# Patient Record
Sex: FEMALE | Race: WHITE | NOT HISPANIC OR LATINO
[De-identification: names, ages, dates, MRNs, and addresses within clinical notes are randomized per-mention and may not be internally consistent; named-entity substitution may affect disease eponyms.]

---

## 2017-01-18 ENCOUNTER — APPOINTMENT (OUTPATIENT)
Dept: HEMATOLOGY ONCOLOGY | Facility: CLINIC | Age: 42
End: 2017-01-18

## 2017-01-31 ENCOUNTER — APPOINTMENT (OUTPATIENT)
Dept: GASTROENTEROLOGY | Facility: CLINIC | Age: 42
End: 2017-01-31

## 2017-02-06 ENCOUNTER — MESSAGE (OUTPATIENT)
Age: 42
End: 2017-02-06

## 2017-06-06 ENCOUNTER — APPOINTMENT (OUTPATIENT)
Dept: GASTROENTEROLOGY | Facility: CLINIC | Age: 42
End: 2017-06-06

## 2017-06-15 ENCOUNTER — INPATIENT (INPATIENT)
Facility: HOSPITAL | Age: 42
LOS: 2 days | Discharge: HOME | DRG: 440 | End: 2017-06-18
Attending: SPECIALIST | Admitting: SPECIALIST
Payer: COMMERCIAL

## 2017-06-15 VITALS
SYSTOLIC BLOOD PRESSURE: 102 MMHG | TEMPERATURE: 98 F | HEIGHT: 65 IN | HEART RATE: 76 BPM | RESPIRATION RATE: 18 BRPM | DIASTOLIC BLOOD PRESSURE: 63 MMHG | OXYGEN SATURATION: 100 % | WEIGHT: 128.31 LBS

## 2017-06-15 DIAGNOSIS — K85.90 ACUTE PANCREATITIS WITHOUT NECROSIS OR INFECTION, UNSPECIFIED: ICD-10-CM

## 2017-06-15 DIAGNOSIS — D50.9 IRON DEFICIENCY ANEMIA, UNSPECIFIED: ICD-10-CM

## 2017-06-15 DIAGNOSIS — K59.00 CONSTIPATION, UNSPECIFIED: ICD-10-CM

## 2017-06-15 DIAGNOSIS — R63.8 OTHER SYMPTOMS AND SIGNS CONCERNING FOOD AND FLUID INTAKE: ICD-10-CM

## 2017-06-15 DIAGNOSIS — Z29.9 ENCOUNTER FOR PROPHYLACTIC MEASURES, UNSPECIFIED: ICD-10-CM

## 2017-06-15 DIAGNOSIS — Z98.890 OTHER SPECIFIED POSTPROCEDURAL STATES: Chronic | ICD-10-CM

## 2017-06-15 DIAGNOSIS — E89.0 POSTPROCEDURAL HYPOTHYROIDISM: Chronic | ICD-10-CM

## 2017-06-15 DIAGNOSIS — E05.00 THYROTOXICOSIS WITH DIFFUSE GOITER WITHOUT THYROTOXIC CRISIS OR STORM: ICD-10-CM

## 2017-06-15 LAB
ALBUMIN SERPL ELPH-MCNC: 3.8 G/DL — SIGNIFICANT CHANGE UP (ref 3.3–5)
ALP SERPL-CCNC: 45 U/L — SIGNIFICANT CHANGE UP (ref 40–120)
ALT FLD-CCNC: 21 U/L — SIGNIFICANT CHANGE UP (ref 10–45)
ANION GAP SERPL CALC-SCNC: 15 MMOL/L — SIGNIFICANT CHANGE UP (ref 5–17)
APPEARANCE UR: CLEAR — SIGNIFICANT CHANGE UP
APTT BLD: 30.8 SEC — SIGNIFICANT CHANGE UP (ref 27.5–37.4)
AST SERPL-CCNC: 24 U/L — SIGNIFICANT CHANGE UP (ref 10–40)
BASOPHILS NFR BLD AUTO: 0 % — SIGNIFICANT CHANGE UP (ref 0–2)
BILIRUB DIRECT SERPL-MCNC: <0.2 MG/DL — SIGNIFICANT CHANGE UP (ref 0–0.2)
BILIRUB SERPL-MCNC: 1.7 MG/DL — HIGH (ref 0.2–1.2)
BILIRUB UR-MCNC: NEGATIVE — SIGNIFICANT CHANGE UP
BLD GP AB SCN SERPL QL: NEGATIVE — SIGNIFICANT CHANGE UP
BUN SERPL-MCNC: 11 MG/DL — SIGNIFICANT CHANGE UP (ref 7–23)
CALCIUM SERPL-MCNC: 8.5 MG/DL — SIGNIFICANT CHANGE UP (ref 8.4–10.5)
CHLORIDE SERPL-SCNC: 100 MMOL/L — SIGNIFICANT CHANGE UP (ref 96–108)
CO2 SERPL-SCNC: 22 MMOL/L — SIGNIFICANT CHANGE UP (ref 22–31)
COLOR SPEC: YELLOW — SIGNIFICANT CHANGE UP
CREAT SERPL-MCNC: 0.7 MG/DL — SIGNIFICANT CHANGE UP (ref 0.5–1.3)
DIFF PNL FLD: (no result)
EOSINOPHIL NFR BLD AUTO: 0.1 % — SIGNIFICANT CHANGE UP (ref 0–6)
GLUCOSE SERPL-MCNC: 96 MG/DL — SIGNIFICANT CHANGE UP (ref 70–99)
GLUCOSE UR QL: NEGATIVE — SIGNIFICANT CHANGE UP
HCG SERPL-ACNC: <.1 MIU/ML — SIGNIFICANT CHANGE UP
HCT VFR BLD CALC: 32.4 % — LOW (ref 34.5–45)
HGB BLD-MCNC: 11 G/DL — LOW (ref 11.5–15.5)
INR BLD: 1.43 — HIGH (ref 0.88–1.16)
KETONES UR-MCNC: 15 MG/DL
LEUKOCYTE ESTERASE UR-ACNC: NEGATIVE — SIGNIFICANT CHANGE UP
LYMPHOCYTES # BLD AUTO: 5.8 % — LOW (ref 13–44)
MCHC RBC-ENTMCNC: 26.8 PG — LOW (ref 27–34)
MCHC RBC-ENTMCNC: 34 G/DL — SIGNIFICANT CHANGE UP (ref 32–36)
MCV RBC AUTO: 79 FL — LOW (ref 80–100)
MONOCYTES NFR BLD AUTO: 6.2 % — SIGNIFICANT CHANGE UP (ref 2–14)
NEUTROPHILS NFR BLD AUTO: 87.9 % — HIGH (ref 43–77)
NITRITE UR-MCNC: NEGATIVE — SIGNIFICANT CHANGE UP
PH UR: 7.5 — SIGNIFICANT CHANGE UP (ref 5–8)
PLATELET # BLD AUTO: 108 K/UL — LOW (ref 150–400)
POTASSIUM SERPL-MCNC: 3.6 MMOL/L — SIGNIFICANT CHANGE UP (ref 3.5–5.3)
POTASSIUM SERPL-SCNC: 3.6 MMOL/L — SIGNIFICANT CHANGE UP (ref 3.5–5.3)
PROT SERPL-MCNC: 6.8 G/DL — SIGNIFICANT CHANGE UP (ref 6–8.3)
PROT UR-MCNC: (no result) MG/DL
PROTHROM AB SERPL-ACNC: 16 SEC — HIGH (ref 9.8–12.7)
RBC # BLD: 4.1 M/UL — SIGNIFICANT CHANGE UP (ref 3.8–5.2)
RBC # FLD: 19.6 % — HIGH (ref 10.3–16.9)
RH IG SCN BLD-IMP: POSITIVE — SIGNIFICANT CHANGE UP
SODIUM SERPL-SCNC: 137 MMOL/L — SIGNIFICANT CHANGE UP (ref 135–145)
SP GR SPEC: <=1.005 — SIGNIFICANT CHANGE UP (ref 1–1.03)
TRIGL SERPL-MCNC: 64 MG/DL — SIGNIFICANT CHANGE UP (ref 10–149)
UROBILINOGEN FLD QL: 0.2 E.U./DL — SIGNIFICANT CHANGE UP
WBC # BLD: 11.7 K/UL — HIGH (ref 3.8–10.5)
WBC # FLD AUTO: 11.7 K/UL — HIGH (ref 3.8–10.5)

## 2017-06-15 PROCEDURE — 74177 CT ABD & PELVIS W/CONTRAST: CPT | Mod: 26

## 2017-06-15 PROCEDURE — 99285 EMERGENCY DEPT VISIT HI MDM: CPT

## 2017-06-15 RX ORDER — HEPARIN SODIUM 5000 [USP'U]/ML
5000 INJECTION INTRAVENOUS; SUBCUTANEOUS EVERY 8 HOURS
Qty: 0 | Refills: 0 | Status: DISCONTINUED | OUTPATIENT
Start: 2017-06-15 | End: 2017-06-17

## 2017-06-15 RX ORDER — SODIUM CHLORIDE 9 MG/ML
1000 INJECTION INTRAMUSCULAR; INTRAVENOUS; SUBCUTANEOUS ONCE
Qty: 0 | Refills: 0 | Status: COMPLETED | OUTPATIENT
Start: 2017-06-15 | End: 2017-06-15

## 2017-06-15 RX ORDER — POLYETHYLENE GLYCOL 3350 17 G/17G
17 POWDER, FOR SOLUTION ORAL
Qty: 0 | Refills: 0 | Status: DISCONTINUED | OUTPATIENT
Start: 2017-06-15 | End: 2017-06-18

## 2017-06-15 RX ORDER — KETOROLAC TROMETHAMINE 30 MG/ML
30 SYRINGE (ML) INJECTION ONCE
Qty: 0 | Refills: 0 | Status: DISCONTINUED | OUTPATIENT
Start: 2017-06-15 | End: 2017-06-15

## 2017-06-15 RX ORDER — SODIUM CHLORIDE 9 MG/ML
3 INJECTION INTRAMUSCULAR; INTRAVENOUS; SUBCUTANEOUS ONCE
Qty: 0 | Refills: 0 | Status: COMPLETED | OUTPATIENT
Start: 2017-06-15 | End: 2017-06-15

## 2017-06-15 RX ORDER — ACETAMINOPHEN 500 MG
650 TABLET ORAL EVERY 6 HOURS
Qty: 0 | Refills: 0 | Status: DISCONTINUED | OUTPATIENT
Start: 2017-06-15 | End: 2017-06-18

## 2017-06-15 RX ORDER — MORPHINE SULFATE 50 MG/1
4 CAPSULE, EXTENDED RELEASE ORAL ONCE
Qty: 0 | Refills: 0 | Status: DISCONTINUED | OUTPATIENT
Start: 2017-06-15 | End: 2017-06-15

## 2017-06-15 RX ORDER — IOHEXOL 300 MG/ML
50 INJECTION, SOLUTION INTRAVENOUS ONCE
Qty: 0 | Refills: 0 | Status: COMPLETED | OUTPATIENT
Start: 2017-06-15 | End: 2017-06-15

## 2017-06-15 RX ORDER — LACTULOSE 10 G/15ML
20 SOLUTION ORAL DAILY
Qty: 0 | Refills: 0 | Status: DISCONTINUED | OUTPATIENT
Start: 2017-06-15 | End: 2017-06-17

## 2017-06-15 RX ORDER — FAMOTIDINE 10 MG/ML
20 INJECTION INTRAVENOUS ONCE
Qty: 0 | Refills: 0 | Status: COMPLETED | OUTPATIENT
Start: 2017-06-15 | End: 2017-06-15

## 2017-06-15 RX ORDER — SODIUM CHLORIDE 9 MG/ML
1000 INJECTION, SOLUTION INTRAVENOUS
Qty: 0 | Refills: 0 | Status: DISCONTINUED | OUTPATIENT
Start: 2017-06-15 | End: 2017-06-17

## 2017-06-15 RX ADMIN — IOHEXOL 50 MILLILITER(S): 300 INJECTION, SOLUTION INTRAVENOUS at 12:42

## 2017-06-15 RX ADMIN — SODIUM CHLORIDE 3 MILLILITER(S): 9 INJECTION INTRAMUSCULAR; INTRAVENOUS; SUBCUTANEOUS at 12:39

## 2017-06-15 RX ADMIN — SODIUM CHLORIDE 250 MILLILITER(S): 9 INJECTION, SOLUTION INTRAVENOUS at 20:52

## 2017-06-15 RX ADMIN — LACTULOSE 20 GRAM(S): 10 SOLUTION ORAL at 20:35

## 2017-06-15 RX ADMIN — Medication 30 MILLIGRAM(S): at 20:28

## 2017-06-15 RX ADMIN — Medication 30 MILLIGRAM(S): at 12:38

## 2017-06-15 RX ADMIN — Medication 30 MILLIGRAM(S): at 23:55

## 2017-06-15 RX ADMIN — MORPHINE SULFATE 4 MILLIGRAM(S): 50 CAPSULE, EXTENDED RELEASE ORAL at 18:02

## 2017-06-15 RX ADMIN — MORPHINE SULFATE 4 MILLIGRAM(S): 50 CAPSULE, EXTENDED RELEASE ORAL at 20:35

## 2017-06-15 RX ADMIN — Medication 30 MILLIGRAM(S): at 23:40

## 2017-06-15 RX ADMIN — POLYETHYLENE GLYCOL 3350 17 GRAM(S): 17 POWDER, FOR SOLUTION ORAL at 23:31

## 2017-06-15 RX ADMIN — FAMOTIDINE 20 MILLIGRAM(S): 10 INJECTION INTRAVENOUS at 12:38

## 2017-06-15 RX ADMIN — HEPARIN SODIUM 5000 UNIT(S): 5000 INJECTION INTRAVENOUS; SUBCUTANEOUS at 23:31

## 2017-06-15 RX ADMIN — SODIUM CHLORIDE 1000 MILLILITER(S): 9 INJECTION INTRAMUSCULAR; INTRAVENOUS; SUBCUTANEOUS at 12:38

## 2017-06-15 NOTE — H&P ADULT - HISTORY OF PRESENT ILLNESS
43 yo F w/ PMHx of Graves dz s/p thyroidectomy (removed 3/4 of thyroid), iron def anemia presents with acute mid-epigastric abd pain. Pt states that on sunday, she started having stomach spasms and had a large BM. On wed, pt ate spicy guacamole and noticed a stabbing pain in the mid-epigastric region that was constant, non-radiating, spasmic associated with nausea and chills. The pain was not alleviated by advil, so she decided to come to the ED. She denies fever, vomiting, diarrhea, SOB, CP, dysuria, headache. She is able to tolerate clear liquid diet. Pt is not a heavy drinker, but she did attend many parties over the last 2 weeks where she was drinking multiple glasses of wine/champagne. She normally does not drink everyday and has never had an issue with ETOH abuse. She denies having RUQ pain in the past after eating heavy meals. She recently traveled to [facility] in May-June. No abd surgeries.     In ED, T 98.4F, HR 76, /63, RR 18, 100% on RA. Given 1L NS bolus, ketorolac 30mg IVP, morphine 4mg IVP, famotidine 20mg IVP with resolution of her symptoms. CT a/p showed evidence of pancreatitis, no radio opaque gallstones. Labs notable for elevated lipase of > 977.

## 2017-06-15 NOTE — ED PROVIDER NOTE - CONSTITUTIONAL, MLM
normal... Well appearing, well nourished, awake, alert, oriented to person, place, time/situation and in moderate painful distress.

## 2017-06-15 NOTE — H&P ADULT - PROBLEM SELECTOR PLAN 2
Pt has been taking iron tabs TID and has had constipation. Last BM was yesterday morning, but was small and hard.   - will not continue ferrous sulfate inpatient  - aggressive bowel regimen with miralax and lactulose

## 2017-06-15 NOTE — ED ADULT NURSE NOTE - OBJECTIVE STATEMENT
Pt came to ED complaining of bilateral upper quadrant abd pain starting today. Pt reports severe pain and tenderness upon palpation.  Pt denies Chest pain, SOB, /GI symptoms, D/N/V, diarrhea, melena.  Positive PMS x4 extremities. Alert and oriented x3. Pt abd is soft, tender upon palpation.

## 2017-06-15 NOTE — H&P ADULT - ASSESSMENT
43 yo F w/ PMHx of Graves dz s/p thyroidectomy (removed 3/4 of thyroid), iron def anemia presents with acute mid-epigastric abd pain, admitted for pancreatitis most likely 2/2 acute alcoholic use.

## 2017-06-15 NOTE — H&P ADULT - NSHPPHYSICALEXAM_GEN_ALL_CORE
.  VITAL SIGNS:  T(C): 37.1, Max: 37.1 (06-15 @ 19:38)  T(F): 98.7, Max: 98.7 (06-15 @ 19:38)  HR: 79 (76 - 79)  BP: 100/65 (100/65 - 102/63)  BP(mean): --  RR: 18 (18 - 18)  SpO2: 100% (100% - 100%)  Wt(kg): --    PHYSICAL EXAM:    Constitutional: WDWN resting comfortably in bed; NAD  Head: NC/AT  Eyes: PERRL, EOMI, anicteric sclera  ENT: no nasal discharge; uvula midline, no oropharyngeal erythema or exudates; MMM  Neck: supple; no JVD or thyromegaly  Respiratory: CTA B/L; no W/R/R, no retractions  Cardiac: +S1/S2; RRR; no M/R/G; PMI non-displaced  Gastrointestinal: +BS, soft, TTP at mid-epigastric region. No TTP of RUQ. No suprapubic tenderness.   Extremities: WWP, no clubbing or cyanosis; no peripheral edema  Musculoskeletal: NROM x4; no joint swelling, tenderness or erythema  Lymphatic: no submandibular or cervical LAD  Neurologic: AAOx3; CNII-XII grossly intact; no focal deficits

## 2017-06-15 NOTE — ED PROVIDER NOTE - OBJECTIVE STATEMENT
Patient is a 42 year old female with PMH anemia who presents to ED c/o mid abdominal pain x 3 days. The pain is described as cramping. Pt reports that she is also constipated, however she tends to have that intermittently due to iron supplements for her anemia. Pt is scheduled for colonoscopy due to anemia on 7/7, however due to the pain this week her PMD Dr. Ricardo sent her here for evaluation. Denies fever, chills, vomiting, diarrhea, urinary symptoms, back pain, cough, chest pain, sick contacts, or recent travel. No prior abdominal surgeries. Denies illicit drug use or etoh abuse.

## 2017-06-15 NOTE — H&P ADULT - PROBLEM SELECTOR PLAN 4
Pt with h/o graves disease s/p thyroidectomy (removed 3/4 of thyroid)   - pt states she follows with her endocrinologist regularly and her TSH has been normal  - she is not taking any thyroid medication  - f/u TSH in AM

## 2017-06-15 NOTE — ED PROVIDER NOTE - ATTENDING CONTRIBUTION TO CARE
43 y/o f with abd pain mostly epigastric, ct and labs consistent with pancreatitis, no other complications such as abscess or psuedocyst, PA discussed with pmd - admission for mild pancreatitis, IVFs, NPO

## 2017-06-15 NOTE — H&P ADULT - NSHPLABSRESULTS_GEN_ALL_CORE
.  LABS:                         11.0   11.7  )-----------( 108      ( 15 Chaz 2017 12:34 )             32.4     06-15    137  |  100  |  11  ----------------------------<  96  3.6   |  22  |  0.70    Ca    8.5      15 Chaz 2017 12:34    TPro  6.8  /  Alb  3.8  /  TBili  1.7<H>  /  DBili  <0.2  /  AST  24  /  ALT  21  /  AlkPhos  45  06-15    PT/INR - ( 15 Chaz 2017 12:34 )   PT: 16.0 sec;   INR: 1.43          PTT - ( 15 Chaz 2017 12:34 )  PTT:30.8 sec  Urinalysis Basic - ( 15 Chaz 2017 20:03 )    Color: Yellow / Appearance: Clear / SG: <=1.005 / pH: x  Gluc: x / Ketone: 15 mg/dL  / Bili: NEGATIVE / Urobili: 0.2 E.U./dL   Blood: x / Protein: Trace mg/dL / Nitrite: NEGATIVE   Leuk Esterase: NEGATIVE / RBC: < 5 /HPF / WBC < 5 /HPF   Sq Epi: x / Non Sq Epi: Moderate /HPF / Bacteria: Present /HPF        RADIOLOGY, EKG & ADDITIONAL TESTS: Reviewed.    CT ABDOMEN AND PELVIS OC IC  6/15/2017    IMPRESSION:  1. Thickened distal pancreatic body and pancreatic tail with surrounding   inflammatory changes and nondrainable fluid consistent with pancreatitis.   Confirmation with amylase and lipase levels is recommended.  2. Underdistention versus rectal wall thickening which could represent   proctitis. Abundant stool throughout the colon compatible with the   patient's history of constipation.  3. Intrahepatic periportal edema  4. 1.4 cm heterogeneous lesion in the left iliac wing as noted above.

## 2017-06-15 NOTE — ED ADULT NURSE NOTE - CHPI ED SYMPTOMS NEG
no hematuria/no nausea/no diarrhea/no blood in stool/no abdominal distension/no chills/no vomiting/no fever/no burning urination/no dysuria

## 2017-06-15 NOTE — H&P ADULT - PROBLEM SELECTOR PLAN 1
Pt presenting with acute mid-epigastric abdominal tenderness, non-radiating with elevated lipase to > 977 and CT a/p findings consistent with pancreatitis. Pt meets 3/3 criteria for pancreatitis.   Etiology most likely from acute alcohol use as pt went to several parties over the last 2 weeks and drank multiple glasses of wine/champagne. Pt does not have h/o ETOH abuse.   Less likely gallstone pancreatitis as pt with BMI 21 and no RUQ abd pain after eating large fatty meals and LFTs normal.   - f/u RUQ u/s to evaluate for cholelithiasis   - f/u triglycerides   - aggressive hydration with LR @ 250cc/hr  - tylenol and ketorolac for pain relief PRN   - will try to avoid using morphine or other opiates as pt with constipation and abundant stool in the colon as seen on CT a/p.  - will start pt on clear liquid diet and advance as tolerates

## 2017-06-16 ENCOUNTER — TRANSCRIPTION ENCOUNTER (OUTPATIENT)
Age: 42
End: 2017-06-16

## 2017-06-16 LAB
ALBUMIN SERPL ELPH-MCNC: 3.2 G/DL — LOW (ref 3.3–5)
ALP SERPL-CCNC: 45 U/L — SIGNIFICANT CHANGE UP (ref 40–120)
ALT FLD-CCNC: 31 U/L — SIGNIFICANT CHANGE UP (ref 10–45)
ANION GAP SERPL CALC-SCNC: 11 MMOL/L — SIGNIFICANT CHANGE UP (ref 5–17)
AST SERPL-CCNC: 23 U/L — SIGNIFICANT CHANGE UP (ref 10–40)
BILIRUB DIRECT SERPL-MCNC: 0.2 MG/DL — SIGNIFICANT CHANGE UP (ref 0–0.2)
BILIRUB INDIRECT FLD-MCNC: 1.2 MG/DL — HIGH (ref 0.2–1)
BILIRUB SERPL-MCNC: 1.4 MG/DL — HIGH (ref 0.2–1.2)
BLD GP AB SCN SERPL QL: NEGATIVE — SIGNIFICANT CHANGE UP
BUN SERPL-MCNC: 9 MG/DL — SIGNIFICANT CHANGE UP (ref 7–23)
CALCIUM SERPL-MCNC: 8.3 MG/DL — LOW (ref 8.4–10.5)
CHLORIDE SERPL-SCNC: 100 MMOL/L — SIGNIFICANT CHANGE UP (ref 96–108)
CO2 SERPL-SCNC: 24 MMOL/L — SIGNIFICANT CHANGE UP (ref 22–31)
CREAT SERPL-MCNC: 0.7 MG/DL — SIGNIFICANT CHANGE UP (ref 0.5–1.3)
ERYTHROCYTE [SEDIMENTATION RATE] IN BLOOD: 52 MM/HR — HIGH
GLUCOSE SERPL-MCNC: 84 MG/DL — SIGNIFICANT CHANGE UP (ref 70–99)
HCT VFR BLD CALC: 29.5 % — LOW (ref 34.5–45)
HGB BLD-MCNC: 9.4 G/DL — LOW (ref 11.5–15.5)
LIDOCAIN IGE QN: 1423 U/L — HIGH (ref 7–60)
MAGNESIUM SERPL-MCNC: 2 MG/DL — SIGNIFICANT CHANGE UP (ref 1.6–2.6)
MCHC RBC-ENTMCNC: 26 PG — LOW (ref 27–34)
MCHC RBC-ENTMCNC: 31.9 G/DL — LOW (ref 32–36)
MCV RBC AUTO: 81.7 FL — SIGNIFICANT CHANGE UP (ref 80–100)
PLATELET # BLD AUTO: 90 K/UL — LOW (ref 150–400)
POTASSIUM SERPL-MCNC: 3.7 MMOL/L — SIGNIFICANT CHANGE UP (ref 3.5–5.3)
POTASSIUM SERPL-SCNC: 3.7 MMOL/L — SIGNIFICANT CHANGE UP (ref 3.5–5.3)
PROT SERPL-MCNC: 5.8 G/DL — LOW (ref 6–8.3)
RBC # BLD: 3.61 M/UL — LOW (ref 3.8–5.2)
RBC # FLD: 19.5 % — HIGH (ref 10.3–16.9)
RH IG SCN BLD-IMP: POSITIVE — SIGNIFICANT CHANGE UP
SODIUM SERPL-SCNC: 135 MMOL/L — SIGNIFICANT CHANGE UP (ref 135–145)
TSH SERPL-MCNC: 4.24 UIU/ML — SIGNIFICANT CHANGE UP (ref 0.35–4.94)
WBC # BLD: 6.1 K/UL — SIGNIFICANT CHANGE UP (ref 3.8–10.5)
WBC # FLD AUTO: 6.1 K/UL — SIGNIFICANT CHANGE UP (ref 3.8–10.5)

## 2017-06-16 PROCEDURE — 76700 US EXAM ABDOM COMPLETE: CPT | Mod: 26

## 2017-06-16 RX ORDER — KETOROLAC TROMETHAMINE 30 MG/ML
30 SYRINGE (ML) INJECTION ONCE
Qty: 0 | Refills: 0 | Status: DISCONTINUED | OUTPATIENT
Start: 2017-06-16 | End: 2017-06-16

## 2017-06-16 RX ADMIN — Medication 30 MILLIGRAM(S): at 06:04

## 2017-06-16 RX ADMIN — POLYETHYLENE GLYCOL 3350 17 GRAM(S): 17 POWDER, FOR SOLUTION ORAL at 05:56

## 2017-06-16 RX ADMIN — HEPARIN SODIUM 5000 UNIT(S): 5000 INJECTION INTRAVENOUS; SUBCUTANEOUS at 05:57

## 2017-06-16 RX ADMIN — Medication 30 MILLIGRAM(S): at 06:19

## 2017-06-16 RX ADMIN — HEPARIN SODIUM 5000 UNIT(S): 5000 INJECTION INTRAVENOUS; SUBCUTANEOUS at 15:38

## 2017-06-16 RX ADMIN — LACTULOSE 20 GRAM(S): 10 SOLUTION ORAL at 15:38

## 2017-06-16 RX ADMIN — SODIUM CHLORIDE 250 MILLILITER(S): 9 INJECTION, SOLUTION INTRAVENOUS at 21:16

## 2017-06-16 RX ADMIN — SODIUM CHLORIDE 250 MILLILITER(S): 9 INJECTION, SOLUTION INTRAVENOUS at 10:00

## 2017-06-16 RX ADMIN — Medication 650 MILLIGRAM(S): at 21:16

## 2017-06-16 RX ADMIN — SODIUM CHLORIDE 250 MILLILITER(S): 9 INJECTION, SOLUTION INTRAVENOUS at 06:21

## 2017-06-16 RX ADMIN — HEPARIN SODIUM 5000 UNIT(S): 5000 INJECTION INTRAVENOUS; SUBCUTANEOUS at 21:17

## 2017-06-16 NOTE — DISCHARGE NOTE ADULT - PLAN OF CARE
no recurrence you were admitted with abdominal pain and found to have pancreatitis. You improved with Intravenous fluids and were able to tolerate a diet. You had an ultrasound showing no gall stones. Likely etiology may be due to your increase in alcohol consumption from your baseline. Please continue to moderate your intake. Follow up with your primary care doctor and Dr. Trejo when discharged. While you were admitted it was also noted that you are anemic. Iron studies were performed indicating it is of chronic disease. Please follow up with Dr. Jnasen, a hematologist who will perform studies to monitor this and determine an etiology. While you were admitted it was also noted that your platelets are low of unknown reason. Please follow up with Dr. Jansen, a hematologist who will perform studies to monitor this and determine an etiology. you were admitted with abdominal pain and found to have pancreatitis. You improved with Intravenous fluids and were able to tolerate a diet. You had an ultrasound showing no gall stones. Likely etiology may be due to your increase in alcohol consumption from your baseline or due to an autoimmune origin which is possible since you have a history of Grave's disease which is also autoimmune. Please follow up with Dr. Krys Mitchell for rheumatology. Please continue to moderate your intake. Follow up with your primary care doctor, Dr. Renato Sy and Dr. Trejo when discharged.

## 2017-06-16 NOTE — DISCHARGE NOTE ADULT - CARE PLAN
Principal Discharge DX:	Pancreatitis  Goal:	no recurrence  Instructions for follow-up, activity and diet:	you were admitted with abdominal pain and found to have pancreatitis. You improved with Intravenous fluids and were able to tolerate a diet. You had an ultrasound showing no gall stones. Likely etiology may be due to your increase in alcohol consumption from your baseline. Please continue to moderate your intake. Follow up with your primary care doctor and Dr. Trejo when discharged. Principal Discharge DX:	Pancreatitis  Goal:	no recurrence  Instructions for follow-up, activity and diet:	you were admitted with abdominal pain and found to have pancreatitis. You improved with Intravenous fluids and were able to tolerate a diet. You had an ultrasound showing no gall stones. Likely etiology may be due to your increase in alcohol consumption from your baseline or due to an autoimmune origin which is possible since you have a history of Grave's disease which is also autoimmune. Please follow up with Dr. Krys Mitchell for rheumatology. Please continue to moderate your intake. Follow up with your primary care doctor, Dr. Renato Sy and Dr. Trejo when discharged.  Secondary Diagnosis:	Anemia, chronic disease  Instructions for follow-up, activity and diet:	While you were admitted it was also noted that you are anemic. Iron studies were performed indicating it is of chronic disease. Please follow up with Dr. Jansen, a hematologist who will perform studies to monitor this and determine an etiology.  Secondary Diagnosis:	Thrombocytopenia  Instructions for follow-up, activity and diet:	While you were admitted it was also noted that your platelets are low of unknown reason. Please follow up with Dr. Jansen, a hematologist who will perform studies to monitor this and determine an etiology.

## 2017-06-16 NOTE — PROGRESS NOTE ADULT - PROBLEM SELECTOR PLAN 1
Pt presenting with acute mid-epigastric abdominal tenderness, non-radiating with elevated lipase to > 977 and CT a/p findings consistent with pancreatitis. Pt meets 3/3 criteria for pancreatitis. No signiicant etoh consumption; though endorsed multiple parties over   - f/u RUQ u/s to evaluate for cholelithiasis   - f/u triglycerides   - aggressive hydration with LR @ 250cc/hr  - tylenol and ketorolac for pain relief PRN   - will try to avoid using morphine or other opiates as pt with constipation and abundant stool in the colon as seen on CT a/p.  - will start pt on clear liquid diet and advance as tolerates

## 2017-06-16 NOTE — DISCHARGE NOTE ADULT - OTHER SIGNIFICANT FINDINGS
Abdominal ultrasound: FINDINGS: Ultrasound evaluation of the abdomen demonstrates no focal   hepatic abnormalities. The liver is normal in size.  No dilated   intrahepatic bile ducts are seen.   The common bile duct is normal in   diameter measuring 0.2 cm.  The gallbladder is normal in appearance   without visible gallstones or wall thickening.   There is small amount of   pericholecystic fluid. The visualized portions of the pancreas are   hypoechoic and enlarged consistent with pancreatitis. Pancreatic duct is   not dilated.   The spleen is 11.0 cm in length and normal in appearance.   The visualized portions of the abdominal aorta and inferior vena cava are   normal in appearance.       The right kidney is 11.9 cm in length and normal in appearance. The left   kidney is 11.1 cm in length and normal in appearance.       IMPRESSION:  Findings consistent with pancreatitis.  Spleen within normal limits of size.  No cholelithiasis or biliary dilatation.    CT abdomen and Pelvis:   IMPRESSION:  1. Thickened distal pancreatic body and pancreatic tail with surrounding   inflammatory changes and nondrainable fluid consistent with pancreatitis.   Confirmation with amylase and lipase levels is recommended.  2. Underdistention versus rectal wall thickening which could represent   proctitis. Abundant stool throughout the colon compatible with the   patient's history of constipation.  3. Intrahepatic periportal edema  4. 1.4 cm heterogeneous lesion in the left iliac wing as noted above.

## 2017-06-16 NOTE — DISCHARGE NOTE ADULT - CARE PROVIDERS DIRECT ADDRESSES
[email or web address]

## 2017-06-16 NOTE — DISCHARGE NOTE ADULT - HOSPITAL COURSE
2 yo F w/ PMHx of Graves dz s/p thyroidectomy (removed 3/4 of thyroid), iron def anemia presents with acute mid-epigastric abd pain found to have pancreatitis (lipase, ct scan, and abdominal pain); started on IVF, LR, and clear liquid diet. Had ultrasound showing no cholelithiasis, evidence of pancreatitis. Patient with increase of etoh from baseline etoh consumption as possible etiology, but given hx autoimmune obtained IgG, ESR, and BRAD for possible autoimmune pancreatitis. Patient improved clinically and is stable for discharge home. 43 yo F w/ PMHx of Graves dz s/p thyroidectomy (removed 3/4 of thyroid), iron def anemia presents with acute mid-epigastric abd pain found to have pancreatitis (lipase, ct scan, and abdominal pain); started on IVF, LR, and clear liquid diet. Had ultrasound showing no cholelithiasis, evidence of pancreatitis. Patient with increase of etoh from baseline with etoh consumption as possible etiology, but given hx autoimmune (Graves disease) obtained IgG, ESR, and BRAD for possible autoimmune pancreatitis. ESR was elevated at 54. She was also noted to be anemic with iron studies consistent with AOCD as well as thrombocytopenia for unknown etiology. Abd U/s was performed and negative for splenomegaly, no gallstones. Patient improved clinically and is stable for discharge home with follow up with Dr. Trejo, her PCP Dr. Renato Sy, Dr. Jansen (hematologist) for anemia/thrombocytopenia, and Dr. Jason for rheumatology.

## 2017-06-16 NOTE — PROGRESS NOTE ADULT - SUBJECTIVE AND OBJECTIVE BOX
:  41 yo F w/ PMHx of Graves dz s/p thyroidectomy (removed 3/4 of thyroid), iron def anemia, vitiligo  presents with acute mid-epigastric abd pain. Pt states that on Sunday, she started having stomach spasms and had a large BM. On wed, pt ate spicy guacamole and noticed a stabbing pain in the mid-epigastric region that was constant, non-radiating, spasmic associated with nausea and chills. The pain was not alleviated by advil, so she decided to come to the ED. She denies fever, vomiting, diarrhea, SOB, CP, dysuria, headache. She is able to tolerate clear liquid diet. Pt is not a heavy drinker, but she did attend many parties over the last 2 weeks where she was drinking multiple glasses of wine/champagne. She normally does not drink everyday and has never had an issue with ETOH abuse. She denies having RUQ pain in the past after eating heavy meals. She recently traveled to [facility] in May-June. No abd surgeries.           REVIEW OF SYSTEMS:  Constitutional: No fever, weight loss or fatigue  ENMT:  No difficulty hearing, tinnitus, vertigo; No sinus or throat pain  Respiratory: No cough, wheezing, chills or hemoptysis  Cardiovascular: No chest pain, palpitations, dizziness or leg swelling  Gastrointestinal: less abdominal/epigastric pain. No nausea, vomiting or hematemesis; No diarrhea or constipation. No melena or hematochezia.  Skin: No itching, burning, rashes or lesions   Musculoskeletal: No joint pain or swelling; No muscle, back or extremity pain    PAST MEDICAL & SURGICAL HISTORY:  Graves disease  Iron deficiency anemia  No pertinent past medical history  H/O partial thyroidectomy: 3/4ths removed  S/P hernia repair      FAMILY HISTORY:  No pertinent family history in first degree relatives      SOCIAL HISTORY:  Smoking Status: [ ] Current, [ ] Former, [ ] Never  Pack Years:    MEDICATIONS:  MEDICATIONS  (STANDING):  lactated ringers. 1000milliLiter(s) IV Continuous <Continuous>  heparin  Injectable 5000Unit(s) SubCutaneous every 8 hours  lactulose Syrup 20Gram(s) Oral daily  polyethylene glycol 3350 17Gram(s) Oral two times a day    MEDICATIONS  (PRN):  acetaminophen   Tablet 650milliGRAM(s) Oral every 6 hours PRN moderate pain      Allergies    No Known Allergies    Intolerances        Vital Signs Last 24 Hrs  T(C): 36.8, Max: 37.4 (06-15 @ 23:06)  T(F): 98.2, Max: 99.4 (06-15 @ 23:06)  HR: 80 (62 - 83)  BP: 119/63 (97/61 - 119/63)  BP(mean): --  RR: 14 (14 - 18)  SpO2: 100% (97% - 100%)        PHYSICAL EXAM:    General: Well developed; well nourished; in no acute distress  HEENT: MMM, conjunctiva and sclera clear  Gastrointestinal: Soft, non-tender non-distended; Normal bowel sounds; No rebound or guarding  Extremities: Normal range of motion, No clubbing, cyanosis or edema  Neurological: Alert and oriented x3  Skin: Warm and dry. No obvious rash      LABS:                        9.4    6.1   )-----------( 90       ( 16 Jun 2017 06:21 )             29.5     06-16    135  |  100  |  9   ----------------------------<  84  3.7   |  24  |  0.70    Ca    8.3<L>      16 Jun 2017 06:22  Mg     2.0     06-16    TPro  5.8<L>  /  Alb  3.2<L>  /  TBili  1.4<H>  /  DBili  0.2  /  AST  23  /  ALT  31  /  AlkPhos  45  06-16          RADIOLOGY & ADDITIONAL STUDIES:   CT ABDOMEN AND PELVIS 1. Thickened distal pancreatic body and pancreatic tail with surrounding   inflammatory changes and nondrainable fluid consistent with pancreatitis.   Confirmation with amylase and lipase levels is recommended.  2. Underdistention versus rectal wall thickening which could represent   proctitis. Abundant stool throughout the colon compatible with the   patient's history of constipation.  3. Intrahepatic periportal edema  4. 1.4 cm heterogeneous lesion in the left iliac wing as noted above.

## 2017-06-16 NOTE — PROGRESS NOTE ADULT - SUBJECTIVE AND OBJECTIVE BOX
INTERVAL HPI/OVERNIGHT EVENTS:  Patient was seen and examined at bedside. As per nurse and patient, no o/n events, patient resting comfortably. Occasional abdominal pain, no nausea or vomiting at this time. Seen with tea at bedside, advised to consume only as tolerated and not excessive. Discussed plan for ultrasound.     VITAL SIGNS:  T(F): 98.2  HR: 80  BP: 119/63  RR: 14  SpO2: 100%  Wt(kg): --    PHYSICAL EXAM:    Constitutional: WDWN, NAD  Eyes: PERRL, EOMI, sclera non-icteric  Neck: supple, trachea midline, no masses, no JVD  Respiratory: CTA b/l, good air entry b/l, no wheezing, rhonchi, rales, without accessory muscle use and no intercostal retractions  Cardiovascular: RRR, normal S1S2, no M/R/G  Gastrointestinal: soft, ND, no masses palpable, BS normal; +mid epigastric  tenderness on deep palpation.   Extremities: Warm, well perfused, pulses equal bilateral upper and lower extremities, no edema, no clubbing  Neurological: AAOx3, CN Grossly intact  Skin: Normal temperature, warm, dry    MEDICATIONS  (STANDING):  lactated ringers. 1000milliLiter(s) IV Continuous <Continuous>  heparin  Injectable 5000Unit(s) SubCutaneous every 8 hours  lactulose Syrup 20Gram(s) Oral daily  polyethylene glycol 3350 17Gram(s) Oral two times a day    MEDICATIONS  (PRN):  acetaminophen   Tablet 650milliGRAM(s) Oral every 6 hours PRN moderate pain      Allergies    No Known Allergies    Intolerances        LABS:                        9.4    6.1   )-----------( 90       ( 16 Jun 2017 06:21 )             29.5     06-16    135  |  100  |  9   ----------------------------<  84  3.7   |  24  |  0.70    Ca    8.3<L>      16 Jun 2017 06:22  Mg     2.0     06-16    TPro  5.8<L>  /  Alb  3.2<L>  /  TBili  1.4<H>  /  DBili  0.2  /  AST  23  /  ALT  31  /  AlkPhos  45  06-16    PT/INR - ( 15 Chaz 2017 12:34 )   PT: 16.0 sec;   INR: 1.43          PTT - ( 15 Chaz 2017 12:34 )  PTT:30.8 sec  Urinalysis Basic - ( 15 Chaz 2017 20:03 )    Color: Yellow / Appearance: Clear / SG: <=1.005 / pH: x  Gluc: x / Ketone: 15 mg/dL  / Bili: NEGATIVE / Urobili: 0.2 E.U./dL   Blood: x / Protein: Trace mg/dL / Nitrite: NEGATIVE   Leuk Esterase: NEGATIVE / RBC: < 5 /HPF / WBC < 5 /HPF   Sq Epi: x / Non Sq Epi: Moderate /HPF / Bacteria: Present /HPF        RADIOLOGY & ADDITIONAL TESTS:

## 2017-06-16 NOTE — DISCHARGE NOTE ADULT - PATIENT PORTAL LINK FT
“You can access the Follow[facility] Patient Portal, offered by [facility], by registering with the following website: [email or web address]”

## 2017-06-16 NOTE — DISCHARGE NOTE ADULT - ADDITIONAL INSTRUCTIONS
Please follow up with your PCP Dr. Renato Sy, Dr. Trejo for future colonoscopy, Dr. Jansen (hematologist) and Dr. Krys Mitchell (rheumatology and autoimmune) within 1-2 weeks. Please continue to advance your diet as tolerated and avoid fatty foods initially.

## 2017-06-16 NOTE — DISCHARGE NOTE ADULT - MEDICATION SUMMARY - MEDICATIONS TO TAKE
I will START or STAY ON the medications listed below when I get home from the hospital:    ferrous sulfate 250 mg oral tablet  -- 1 tab(s) by mouth 3 times a day  -- Indication: For Anemia, chronic disease

## 2017-06-16 NOTE — DISCHARGE NOTE ADULT - CARE PROVIDER_API CALL
Kaiden Trejo), Medicine  [address] [facility] [address]  [place] [address]  Phone: [phone]  Fax: [phone] Kaiden Trejo), Medicine  [address]  [address]  [place] [address]  Phone: [phone]  Fax: [phone]    Krys Mitchell), Internal Medicine; Rheumatology  [address]  [place] [address]  Phone: [phone]  Fax: [phone]    Diandra Jansen), Hematology; Internal Medicine  [address] [address]  Phone: [phone]  Fax: [phone]

## 2017-06-17 DIAGNOSIS — D63.8 ANEMIA IN OTHER CHRONIC DISEASES CLASSIFIED ELSEWHERE: ICD-10-CM

## 2017-06-17 DIAGNOSIS — D69.6 THROMBOCYTOPENIA, UNSPECIFIED: ICD-10-CM

## 2017-06-17 LAB
ALBUMIN SERPL ELPH-MCNC: 3.6 G/DL — SIGNIFICANT CHANGE UP (ref 3.3–5)
ALP SERPL-CCNC: 159 U/L — HIGH (ref 40–120)
ALT FLD-CCNC: 51 U/L — HIGH (ref 10–45)
ANION GAP SERPL CALC-SCNC: 12 MMOL/L — SIGNIFICANT CHANGE UP (ref 5–17)
AST SERPL-CCNC: 38 U/L — SIGNIFICANT CHANGE UP (ref 10–40)
BILIRUB DIRECT SERPL-MCNC: <0.2 MG/DL — SIGNIFICANT CHANGE UP (ref 0–0.2)
BILIRUB INDIRECT FLD-MCNC: >0.7 MG/DL — SIGNIFICANT CHANGE UP (ref 0.2–1)
BILIRUB SERPL-MCNC: 0.9 MG/DL — SIGNIFICANT CHANGE UP (ref 0.2–1.2)
BUN SERPL-MCNC: 6 MG/DL — LOW (ref 7–23)
CALCIUM SERPL-MCNC: 8.2 MG/DL — LOW (ref 8.4–10.5)
CHLORIDE SERPL-SCNC: 105 MMOL/L — SIGNIFICANT CHANGE UP (ref 96–108)
CO2 SERPL-SCNC: 22 MMOL/L — SIGNIFICANT CHANGE UP (ref 22–31)
CREAT SERPL-MCNC: 0.6 MG/DL — SIGNIFICANT CHANGE UP (ref 0.5–1.3)
FERRITIN SERPL-MCNC: 162.5 NG/ML — HIGH (ref 15–150)
GLUCOSE SERPL-MCNC: 82 MG/DL — SIGNIFICANT CHANGE UP (ref 70–99)
HCT VFR BLD CALC: 27.3 % — LOW (ref 34.5–45)
HGB BLD-MCNC: 8.9 G/DL — LOW (ref 11.5–15.5)
IRON SATN MFR SERPL: 10 % — LOW (ref 14–50)
IRON SATN MFR SERPL: 25 UG/DL — LOW (ref 30–160)
MCHC RBC-ENTMCNC: 26 PG — LOW (ref 27–34)
MCHC RBC-ENTMCNC: 32.6 G/DL — SIGNIFICANT CHANGE UP (ref 32–36)
MCV RBC AUTO: 79.8 FL — LOW (ref 80–100)
PLATELET # BLD AUTO: 78 K/UL — LOW (ref 150–400)
POTASSIUM SERPL-MCNC: 4.1 MMOL/L — SIGNIFICANT CHANGE UP (ref 3.5–5.3)
POTASSIUM SERPL-SCNC: 4.1 MMOL/L — SIGNIFICANT CHANGE UP (ref 3.5–5.3)
PROT SERPL-MCNC: 6.5 G/DL — SIGNIFICANT CHANGE UP (ref 6–8.3)
RBC # BLD: 3.42 M/UL — LOW (ref 3.8–5.2)
RBC # FLD: 19.3 % — HIGH (ref 10.3–16.9)
SODIUM SERPL-SCNC: 139 MMOL/L — SIGNIFICANT CHANGE UP (ref 135–145)
TIBC SERPL-MCNC: 242 UG/DL — SIGNIFICANT CHANGE UP (ref 220–430)
UIBC SERPL-MCNC: 217 UG/DL — SIGNIFICANT CHANGE UP (ref 110–370)
WBC # BLD: 6 K/UL — SIGNIFICANT CHANGE UP (ref 3.8–10.5)
WBC # FLD AUTO: 6 K/UL — SIGNIFICANT CHANGE UP (ref 3.8–10.5)

## 2017-06-17 RX ORDER — SODIUM CHLORIDE 9 MG/ML
1000 INJECTION, SOLUTION INTRAVENOUS
Qty: 0 | Refills: 0 | Status: DISCONTINUED | OUTPATIENT
Start: 2017-06-17 | End: 2017-06-18

## 2017-06-17 RX ORDER — SODIUM FERRIC GLUCONAT/SUCROSE 62.5MG/5ML
25 AMPUL (ML) INTRAVENOUS ONCE
Qty: 0 | Refills: 0 | Status: COMPLETED | OUTPATIENT
Start: 2017-06-17 | End: 2017-06-17

## 2017-06-17 RX ORDER — SODIUM FERRIC GLUCONAT/SUCROSE 62.5MG/5ML
100 AMPUL (ML) INTRAVENOUS ONCE
Qty: 0 | Refills: 0 | Status: COMPLETED | OUTPATIENT
Start: 2017-06-17 | End: 2017-06-17

## 2017-06-17 RX ORDER — KETOROLAC TROMETHAMINE 30 MG/ML
15 SYRINGE (ML) INJECTION ONCE
Qty: 0 | Refills: 0 | Status: DISCONTINUED | OUTPATIENT
Start: 2017-06-17 | End: 2017-06-17

## 2017-06-17 RX ADMIN — POLYETHYLENE GLYCOL 3350 17 GRAM(S): 17 POWDER, FOR SOLUTION ORAL at 17:24

## 2017-06-17 RX ADMIN — Medication 15 MILLIGRAM(S): at 04:15

## 2017-06-17 RX ADMIN — Medication 650 MILLIGRAM(S): at 13:34

## 2017-06-17 RX ADMIN — SODIUM CHLORIDE 250 MILLILITER(S): 9 INJECTION, SOLUTION INTRAVENOUS at 04:18

## 2017-06-17 RX ADMIN — HEPARIN SODIUM 5000 UNIT(S): 5000 INJECTION INTRAVENOUS; SUBCUTANEOUS at 05:39

## 2017-06-17 RX ADMIN — SODIUM CHLORIDE 125 MILLILITER(S): 9 INJECTION, SOLUTION INTRAVENOUS at 23:13

## 2017-06-17 RX ADMIN — Medication 312 MILLIGRAM(S): at 17:24

## 2017-06-17 RX ADMIN — Medication 216 MILLIGRAM(S): at 22:01

## 2017-06-17 NOTE — PROGRESS NOTE ADULT - PROBLEM SELECTOR PLAN 2
Patient with elevated ESR, Ferritin of 162 and TIBC low range of normal with history of autoimmune diseases. In this setting the Transferrin saturation fo 10 should be itnereperted as ACD.  No role for Colonoscopy. Patient with elevated ESR, Ferritin of 162 and TIBC low range of normal with history of autoimmune diseases. In this setting the Transferrin saturation of 10 should be interrupted as ACD.  No role for Colonoscopy.

## 2017-06-17 NOTE — PROGRESS NOTE ADULT - SUBJECTIVE AND OBJECTIVE BOX
OVERNIGHT: No overnight events.  SUBJECTIVE: Patient seen and examined at bedside. Tolerating diet.     ROS:  CV: Denies chest pain  Resp: Denies SOB  GI: Denies abdominal pain, constipation, diarrhea, nausea, vomiting  : Denies dysuria  ID: Denies fevers, chills  MSK: Denies joint pain     OBJECTIVE:    VITAL SIGNS:  ICU Vital Signs Last 24 Hrs  T(C): 36.6, Max: 37.1 (06-17 @ 05:43)  T(F): 97.9, Max: 98.7 (06-17 @ 05:43)  HR: 65 (65 - 72)  BP: 119/81 (105/67 - 119/81)  BP(mean): --  ABP: --  ABP(mean): --  RR: 20 (18 - 20)  SpO2: 96% (96% - 99%)        I & Os for current day (as of 06-17 @ 17:05)  =============================================  IN: 5750 ml / OUT: 0 ml / NET: 5750 ml    CAPILLARY BLOOD GLUCOSE      PHYSICAL EXAM:    General: NAD, comfortable  HEENT: NCAT, PERRL,  conjunctival pallor, no scleral icterus, vitilligo  Neck: supple, no JVD  Respiratory: CTA b/l, no wheezing, rhonchi, rales  Cardiovascular: RRR, normal S1S2, no M/R/G  Abdomen: soft, NT/ND, bowel sounds in all four quadrants, no palpable masses  Extremities: WWP, no clubbing, cyanosis, or edema      MEDICATIONS:  MEDICATIONS  (STANDING):  polyethylene glycol 3350 17Gram(s) Oral two times a day  lactated ringers. 1000milliLiter(s) IV Continuous <Continuous>  sodium ferric gluconate complex IVPB 25milliGRAM(s) IV Intermittent once    MEDICATIONS  (PRN):  acetaminophen   Tablet 650milliGRAM(s) Oral every 6 hours PRN moderate pain      ALLERGIES:  Allergies    No Known Allergies    Intolerances        LABS:                        8.9    6.0   )-----------( 78       ( 17 Jun 2017 06:53 )             27.3     06-17    139  |  105  |  6<L>  ----------------------------<  82  4.1   |  22  |  0.60    Ca    8.2<L>      17 Jun 2017 06:53  Mg     2.0     06-16    TPro  6.5  /  Alb  3.6  /  TBili  0.9  /  DBili  <0.2  /  AST  38  /  ALT  51<H>  /  AlkPhos  159<H>  06-17      Urinalysis Basic - ( 15 Chaz 2017 20:03 )    Color: Yellow / Appearance: Clear / SG: <=1.005 / pH: x  Gluc: x / Ketone: 15 mg/dL  / Bili: NEGATIVE / Urobili: 0.2 E.U./dL   Blood: x / Protein: Trace mg/dL / Nitrite: NEGATIVE   Leuk Esterase: NEGATIVE / RBC: < 5 /HPF / WBC < 5 /HPF   Sq Epi: x / Non Sq Epi: Moderate /HPF / Bacteria: Present /HPF        RADIOLOGY & ADDITIONAL TESTS: Reviewed.

## 2017-06-17 NOTE — PROGRESS NOTE ADULT - SUBJECTIVE AND OBJECTIVE BOX
Pt seen and examined  Still with episodes of epigastric pain    REVIEW OF SYSTEMS:  Constitutional: No fever, weight loss or fatigue  Cardiovascular: No chest pain, palpitations, dizziness or leg swelling  Gastrointestinal: + abdominal  pain. No nausea, vomiting or hematemesis; No diarrhea or constipation. No melena or hematochezia.  Skin: No itching, burning, rashes or lesions       MEDICATIONS:  MEDICATIONS  (STANDING):  lactated ringers. 1000milliLiter(s) IV Continuous <Continuous>  polyethylene glycol 3350 17Gram(s) Oral two times a day    MEDICATIONS  (PRN):  acetaminophen   Tablet 650milliGRAM(s) Oral every 6 hours PRN moderate pain      Allergies    No Known Allergies    Intolerances        Vital Signs Last 24 Hrs  T(C): 37.1, Max: 37.1 (06-17 @ 05:43)  T(F): 98.7, Max: 98.7 (06-17 @ 05:43)  HR: 72 (72 - 76)  BP: 105/67 (105/67 - 110/70)  BP(mean): --  RR: 18 (17 - 18)  SpO2: 97% (97% - 99%)    I & Os for current day (as of 06-17 @ 11:56)  =============================================  IN: 5750 ml / OUT: 0 ml / NET: 5750 ml      PHYSICAL EXAM:    General: Well developed; well nourished; in no acute distress  HEENT: MMM, conjunctiva and sclera clear  Gastrointestinal: Soft non-tender non-distended; Normal bowel sounds; No hepatosplenomegaly  Skin: Warm and dry. No obvious rash    LABS:      CBC Full  -  ( 17 Jun 2017 06:53 )  WBC Count : 6.0 K/uL  Hemoglobin : 8.9 g/dL  Hematocrit : 27.3 %  Platelet Count - Automated : 78 K/uL  Mean Cell Volume : 79.8 fL  Mean Cell Hemoglobin : 26.0 pg  Mean Cell Hemoglobin Concentration : 32.6 g/dL  Auto Neutrophil # : x  Auto Lymphocyte # : x  Auto Monocyte # : x  Auto Eosinophil # : x  Auto Basophil # : x  Auto Neutrophil % : x  Auto Lymphocyte % : x  Auto Monocyte % : x  Auto Eosinophil % : x  Auto Basophil % : x    06-17    139  |  105  |  6<L>  ----------------------------<  82  4.1   |  22  |  0.60    Ca    8.2<L>      17 Jun 2017 06:53  Mg     2.0     06-16    TPro  5.8<L>  /  Alb  3.2<L>  /  TBili  1.4<H>  /  DBili  0.2  /  AST  23  /  ALT  31  /  AlkPhos  45  06-16    PT/INR - ( 15 Chaz 2017 12:34 )   PT: 16.0 sec;   INR: 1.43          PTT - ( 15 Chaz 2017 12:34 )  PTT:30.8 sec      Urinalysis Basic - ( 15 Chaz 2017 20:03 )    Color: Yellow / Appearance: Clear / SG: <=1.005 / pH: x  Gluc: x / Ketone: 15 mg/dL  / Bili: NEGATIVE / Urobili: 0.2 E.U./dL   Blood: x / Protein: Trace mg/dL / Nitrite: NEGATIVE   Leuk Esterase: NEGATIVE / RBC: < 5 /HPF / WBC < 5 /HPF   Sq Epi: x / Non Sq Epi: Moderate /HPF / Bacteria: Present /HPF                RADIOLOGY & ADDITIONAL STUDIES (The following images were personally reviewed):

## 2017-06-17 NOTE — PROGRESS NOTE ADULT - PROBLEM SELECTOR PLAN 1
Pt presenting with acute mid-epigastric abdominal tenderness, non-radiating with elevated lipase to > 977 and CT a/p findings consistent with pancreatitis. Pt meets 3/3 criteria for pancreatitis. No signiicant etoh consumption; though endorsed multiple parties over   - f/u RUQ u/s to evaluate for cholelithiasis   - f/u triglycerides   - Now on    - tylenol and ketorolac for pain relief PRN   - will try to avoid using morphine or other opiates as pt with constipation and abundant stool in the colon as seen on CT a/p.  -Advancing diet Pt presenting with acute mid-epigastric abdominal tenderness, non-radiating with elevated lipase to > 977 and CT a/p findings consistent with pancreatitis. Pt meets 3/3 criteria for pancreatitis. No significant etoh consumption; though endorsed multiple parties over   - f/u RUQ u/s to evaluate for cholelithiasis   - f/u triglycerides   - Now on    - tylenol and ketorolac for pain relief PRN   - will try to avoid using morphine or other opiates as pt with constipation and abundant stool in the colon as seen on CT a/p.  -Advancing diet

## 2017-06-18 VITALS
HEART RATE: 64 BPM | SYSTOLIC BLOOD PRESSURE: 126 MMHG | DIASTOLIC BLOOD PRESSURE: 75 MMHG | OXYGEN SATURATION: 97 % | TEMPERATURE: 98 F | RESPIRATION RATE: 19 BRPM

## 2017-06-18 LAB
ANA TITR SER: NEGATIVE — SIGNIFICANT CHANGE UP
ANION GAP SERPL CALC-SCNC: 13 MMOL/L — SIGNIFICANT CHANGE UP (ref 5–17)
BUN SERPL-MCNC: 7 MG/DL — SIGNIFICANT CHANGE UP (ref 7–23)
CALCIUM SERPL-MCNC: 8 MG/DL — LOW (ref 8.4–10.5)
CHLORIDE SERPL-SCNC: 105 MMOL/L — SIGNIFICANT CHANGE UP (ref 96–108)
CO2 SERPL-SCNC: 21 MMOL/L — LOW (ref 22–31)
CREAT SERPL-MCNC: 0.6 MG/DL — SIGNIFICANT CHANGE UP (ref 0.5–1.3)
GLUCOSE SERPL-MCNC: 88 MG/DL — SIGNIFICANT CHANGE UP (ref 70–99)
HCT VFR BLD CALC: 25.9 % — LOW (ref 34.5–45)
HGB BLD-MCNC: 8.7 G/DL — LOW (ref 11.5–15.5)
MAGNESIUM SERPL-MCNC: 1.7 MG/DL — SIGNIFICANT CHANGE UP (ref 1.6–2.6)
MCHC RBC-ENTMCNC: 26.6 PG — LOW (ref 27–34)
MCHC RBC-ENTMCNC: 33.6 G/DL — SIGNIFICANT CHANGE UP (ref 32–36)
MCV RBC AUTO: 79.2 FL — LOW (ref 80–100)
PLATELET # BLD AUTO: 88 K/UL — LOW (ref 150–400)
POTASSIUM SERPL-MCNC: 3.6 MMOL/L — SIGNIFICANT CHANGE UP (ref 3.5–5.3)
POTASSIUM SERPL-SCNC: 3.6 MMOL/L — SIGNIFICANT CHANGE UP (ref 3.5–5.3)
RBC # BLD: 3.27 M/UL — LOW (ref 3.8–5.2)
RBC # FLD: 19.1 % — HIGH (ref 10.3–16.9)
SODIUM SERPL-SCNC: 139 MMOL/L — SIGNIFICANT CHANGE UP (ref 135–145)
WBC # BLD: 4.4 K/UL — SIGNIFICANT CHANGE UP (ref 3.8–10.5)
WBC # FLD AUTO: 4.4 K/UL — SIGNIFICANT CHANGE UP (ref 3.8–10.5)

## 2017-06-18 PROCEDURE — 80048 BASIC METABOLIC PNL TOTAL CA: CPT

## 2017-06-18 PROCEDURE — 82787 IGG 1 2 3 OR 4 EACH: CPT

## 2017-06-18 PROCEDURE — 80053 COMPREHEN METABOLIC PANEL: CPT

## 2017-06-18 PROCEDURE — 36415 COLL VENOUS BLD VENIPUNCTURE: CPT

## 2017-06-18 PROCEDURE — 85027 COMPLETE CBC AUTOMATED: CPT

## 2017-06-18 PROCEDURE — 85730 THROMBOPLASTIN TIME PARTIAL: CPT

## 2017-06-18 PROCEDURE — 86901 BLOOD TYPING SEROLOGIC RH(D): CPT

## 2017-06-18 PROCEDURE — 83550 IRON BINDING TEST: CPT

## 2017-06-18 PROCEDURE — 99285 EMERGENCY DEPT VISIT HI MDM: CPT | Mod: 25

## 2017-06-18 PROCEDURE — 86900 BLOOD TYPING SEROLOGIC ABO: CPT

## 2017-06-18 PROCEDURE — 81001 URINALYSIS AUTO W/SCOPE: CPT

## 2017-06-18 PROCEDURE — 74177 CT ABD & PELVIS W/CONTRAST: CPT

## 2017-06-18 PROCEDURE — 85610 PROTHROMBIN TIME: CPT

## 2017-06-18 PROCEDURE — 82248 BILIRUBIN DIRECT: CPT

## 2017-06-18 PROCEDURE — 84443 ASSAY THYROID STIM HORMONE: CPT

## 2017-06-18 PROCEDURE — 83735 ASSAY OF MAGNESIUM: CPT

## 2017-06-18 PROCEDURE — 82728 ASSAY OF FERRITIN: CPT

## 2017-06-18 PROCEDURE — 96375 TX/PRO/DX INJ NEW DRUG ADDON: CPT

## 2017-06-18 PROCEDURE — 80076 HEPATIC FUNCTION PANEL: CPT

## 2017-06-18 PROCEDURE — 85652 RBC SED RATE AUTOMATED: CPT

## 2017-06-18 PROCEDURE — 84702 CHORIONIC GONADOTROPIN TEST: CPT

## 2017-06-18 PROCEDURE — 83690 ASSAY OF LIPASE: CPT

## 2017-06-18 PROCEDURE — 84478 ASSAY OF TRIGLYCERIDES: CPT

## 2017-06-18 PROCEDURE — 86850 RBC ANTIBODY SCREEN: CPT

## 2017-06-18 PROCEDURE — 96374 THER/PROPH/DIAG INJ IV PUSH: CPT | Mod: XU

## 2017-06-18 PROCEDURE — 76700 US EXAM ABDOM COMPLETE: CPT

## 2017-06-18 PROCEDURE — 86038 ANTINUCLEAR ANTIBODIES: CPT

## 2017-06-18 PROCEDURE — 85025 COMPLETE CBC W/AUTO DIFF WBC: CPT

## 2017-06-18 RX ORDER — SIMETHICONE 80 MG/1
80 TABLET, CHEWABLE ORAL THREE TIMES A DAY
Qty: 0 | Refills: 0 | Status: DISCONTINUED | OUTPATIENT
Start: 2017-06-18 | End: 2017-06-18

## 2017-06-18 RX ORDER — POLYETHYLENE GLYCOL 3350 17 G/17G
17 POWDER, FOR SOLUTION ORAL DAILY
Qty: 0 | Refills: 0 | Status: DISCONTINUED | OUTPATIENT
Start: 2017-06-18 | End: 2017-06-18

## 2017-06-18 RX ADMIN — SIMETHICONE 80 MILLIGRAM(S): 80 TABLET, CHEWABLE ORAL at 03:50

## 2017-06-18 NOTE — PROGRESS NOTE ADULT - SUBJECTIVE AND OBJECTIVE BOX
Pt seen and examined NO COMPLAINTS, TOLERATING DIET    REVIEW OF SYSTEMS:  Constitutional: No fever, weight loss or fatigue  Cardiovascular: No chest pain, palpitations, dizziness or leg swelling  Gastrointestinal: No abdominal or epigastric pain. No nausea, vomiting or hematemesis; No diarrhea or constipation. No melena or hematochezia.  Skin: No itching, burning, rashes or lesions       MEDICATIONS:  MEDICATIONS  (STANDING):  lactated ringers. 1000milliLiter(s) IV Continuous <Continuous>    MEDICATIONS  (PRN):  acetaminophen   Tablet 650milliGRAM(s) Oral every 6 hours PRN moderate pain  simethicone 80milliGRAM(s) Chew three times a day PRN Gas  polyethylene glycol 3350 17Gram(s) Oral daily PRN Constipation      Allergies    No Known Allergies    Intolerances        Vital Signs Last 24 Hrs  T(C): 36.8, Max: 36.8 (06-18 @ 05:59)  T(F): 98.3, Max: 98.3 (06-18 @ 05:59)  HR: 64 (57 - 65)  BP: 126/75 (119/81 - 126/75)  BP(mean): --  RR: 19 (18 - 20)  SpO2: 97% (96% - 98%)    I & Os for current day (as of 06-18 @ 11:16)  =============================================  IN: 1500 ml / OUT: 0 ml / NET: 1500 ml      PHYSICAL EXAM:    General: Well developed; well nourished; in no acute distress  HEENT: MMM, conjunctiva and sclera clear  Gastrointestinal: Soft non-tender non-distended; Normal bowel sounds; No hepatosplenomegaly  Skin: Warm and dry. No obvious rash    LABS:      CBC Full  -  ( 18 Jun 2017 05:24 )  WBC Count : 4.4 K/uL  Hemoglobin : 8.7 g/dL  Hematocrit : 25.9 %  Platelet Count - Automated : 88 K/uL  Mean Cell Volume : 79.2 fL  Mean Cell Hemoglobin : 26.6 pg  Mean Cell Hemoglobin Concentration : 33.6 g/dL  Auto Neutrophil # : x  Auto Lymphocyte # : x  Auto Monocyte # : x  Auto Eosinophil # : x  Auto Basophil # : x  Auto Neutrophil % : x  Auto Lymphocyte % : x  Auto Monocyte % : x  Auto Eosinophil % : x  Auto Basophil % : x    06-18    139  |  105  |  7   ----------------------------<  88  3.6   |  21<L>  |  0.60    Ca    8.0<L>      18 Jun 2017 05:25  Mg     1.7     06-18    TPro  6.5  /  Alb  3.6  /  TBili  0.9  /  DBili  <0.2  /  AST  38  /  ALT  51<H>  /  AlkPhos  159<H>  06-17                      RADIOLOGY & ADDITIONAL STUDIES (The following images were personally reviewed):

## 2017-06-18 NOTE — PROGRESS NOTE ADULT - ASSESSMENT
41 yo F w/ PMHx of Graves dz s/p thyroidectomy (removed 3/4 of thyroid), iron def anemia presents with acute mid-epigastric abd pain, admitted for pancreatitis most likely 2/2 acute alcoholic use.
43 yo F w/ PMHx of Graves dz s/p thyroidectomy (removed 3/4 of thyroid), iron def anemia presents with acute mid-epigastric abd pain, admitted for pancreatitis most likely 2/2 acute alcoholic use.
DISCHARGE AND FOLLOW UP with Dr. BOWSER (INTERNIST),  Dr.. ARTHUR (HEME) DR. WILLS (RHEUM)
not ready for discharge

## 2017-06-19 LAB
IGG SERPL-MCNC: 907 MG/DL — SIGNIFICANT CHANGE UP (ref 767–1590)
IGG1 SER-MCNC: 435 MG/DL — SIGNIFICANT CHANGE UP (ref 341–894)
IGG2 SER-MCNC: 315 MG/DL — SIGNIFICANT CHANGE UP (ref 171–632)
IGG3 SER-MCNC: 30.8 MG/DL — SIGNIFICANT CHANGE UP (ref 18.4–106)
IGG4 SER-MCNC: 6.9 MG/DL — SIGNIFICANT CHANGE UP (ref 2.4–121)

## 2017-06-20 LAB — ANA TITR SER: NEGATIVE — SIGNIFICANT CHANGE UP

## 2017-06-21 DIAGNOSIS — D50.9 IRON DEFICIENCY ANEMIA, UNSPECIFIED: ICD-10-CM

## 2017-06-21 DIAGNOSIS — Z72.89 OTHER PROBLEMS RELATED TO LIFESTYLE: ICD-10-CM

## 2017-06-21 DIAGNOSIS — K59.00 CONSTIPATION, UNSPECIFIED: ICD-10-CM

## 2017-06-21 DIAGNOSIS — E89.0 POSTPROCEDURAL HYPOTHYROIDISM: ICD-10-CM

## 2017-06-21 DIAGNOSIS — Z87.891 PERSONAL HISTORY OF NICOTINE DEPENDENCE: ICD-10-CM

## 2017-06-21 DIAGNOSIS — Z79.899 OTHER LONG TERM (CURRENT) DRUG THERAPY: ICD-10-CM

## 2017-06-21 DIAGNOSIS — D63.8 ANEMIA IN OTHER CHRONIC DISEASES CLASSIFIED ELSEWHERE: ICD-10-CM

## 2017-06-21 DIAGNOSIS — K85.20 ALCOHOL INDUCED ACUTE PANCREATITIS WITHOUT NECROSIS OR INFECTION: ICD-10-CM

## 2017-06-21 DIAGNOSIS — E05.00 THYROTOXICOSIS WITH DIFFUSE GOITER WITHOUT THYROTOXIC CRISIS OR STORM: ICD-10-CM

## 2017-06-21 DIAGNOSIS — D69.6 THROMBOCYTOPENIA, UNSPECIFIED: ICD-10-CM

## 2018-05-09 ENCOUNTER — APPOINTMENT (OUTPATIENT)
Dept: ULTRASOUND IMAGING | Facility: HOSPITAL | Age: 43
End: 2018-05-09
Payer: SELF-PAY

## 2018-05-09 ENCOUNTER — OUTPATIENT (OUTPATIENT)
Dept: OUTPATIENT SERVICES | Facility: HOSPITAL | Age: 43
LOS: 1 days | End: 2018-05-09
Payer: COMMERCIAL

## 2018-05-09 DIAGNOSIS — E89.0 POSTPROCEDURAL HYPOTHYROIDISM: Chronic | ICD-10-CM

## 2018-05-09 DIAGNOSIS — Z98.890 OTHER SPECIFIED POSTPROCEDURAL STATES: Chronic | ICD-10-CM

## 2018-05-09 PROCEDURE — 76700 US EXAM ABDOM COMPLETE: CPT | Mod: 26

## 2018-05-09 PROCEDURE — 76700 US EXAM ABDOM COMPLETE: CPT

## 2018-05-19 ENCOUNTER — EMERGENCY (EMERGENCY)
Facility: HOSPITAL | Age: 43
LOS: 1 days | Discharge: HOME | End: 2018-05-19
Attending: EMERGENCY MEDICINE | Admitting: EMERGENCY MEDICINE
Payer: COMMERCIAL

## 2018-05-19 VITALS
DIASTOLIC BLOOD PRESSURE: 73 MMHG | HEIGHT: 65 IN | HEART RATE: 68 BPM | RESPIRATION RATE: 18 BRPM | WEIGHT: 128.09 LBS | TEMPERATURE: 98 F | SYSTOLIC BLOOD PRESSURE: 127 MMHG | OXYGEN SATURATION: 98 %

## 2018-05-19 DIAGNOSIS — K62.89 OTHER SPECIFIED DISEASES OF ANUS AND RECTUM: ICD-10-CM

## 2018-05-19 DIAGNOSIS — K56.41 FECAL IMPACTION: ICD-10-CM

## 2018-05-19 DIAGNOSIS — Z98.890 OTHER SPECIFIED POSTPROCEDURAL STATES: Chronic | ICD-10-CM

## 2018-05-19 DIAGNOSIS — E89.0 POSTPROCEDURAL HYPOTHYROIDISM: Chronic | ICD-10-CM

## 2018-05-19 PROCEDURE — 99283 EMERGENCY DEPT VISIT LOW MDM: CPT

## 2018-05-19 PROCEDURE — 99282 EMERGENCY DEPT VISIT SF MDM: CPT

## 2018-05-19 RX ORDER — POLYETHYLENE GLYCOL 3350 17 G/17G
17 POWDER, FOR SOLUTION ORAL
Qty: 1 | Refills: 0 | OUTPATIENT
Start: 2018-05-19 | End: 2018-05-28

## 2018-05-19 RX ORDER — FERROUS SULFATE 325(65) MG
1 TABLET ORAL
Qty: 0 | Refills: 0 | COMMUNITY

## 2018-05-19 RX ADMIN — Medication 1 ENEMA: at 10:01

## 2018-05-19 NOTE — ED PROVIDER NOTE - GASTROINTESTINAL, MLM
Abdomen soft, non-tender, no guarding- as per RN- rectum dilated w large amount of stool- passed copiuos amt w assistance

## 2018-05-19 NOTE — ED ADULT TRIAGE NOTE - CHIEF COMPLAINT QUOTE
"I have back and rectal pain. I feel like I have to go to a bathroom but I can't push it out. Last time I had BM  2 days ago, but it was very small and constipated." "I have back and rectal pain. I feel like I have to go to a bathroom but I can't push it out. Last time I had BM was 2 days ago, but it was very small and constipated."

## 2018-05-19 NOTE — ED PROVIDER NOTE - OBJECTIVE STATEMENT
43 F co 1 day of sharp rectal pain- normally moves her bowels everyday- did not move bowels yesterday and was unable to go today- developed severe pain  was worried she had an infection- has not placed anything in her rectum  no f/c no discharge   motrin taken for mod/severe pain  pain is improved after passing large amt of stool in the ED

## 2018-05-19 NOTE — ED PROVIDER NOTE - MEDICAL DECISION MAKING DETAILS
fecal impaction- markedly improved- soft abd, tolerating po juice/coffee no n/v- fleet enema, miralax

## 2018-05-19 NOTE — ED ADULT NURSE NOTE - CHIEF COMPLAINT QUOTE
"I have back and rectal pain. I feel like I have to go to a bathroom but I can't push it out. Last time I had BM was 2 days ago, but it was very small and constipated."

## 2018-05-19 NOTE — ED ADULT NURSE REASSESSMENT NOTE - NS ED NURSE REASSESS COMMENT FT1
Pt noted to have stool in rectum, causing pressure and discomfort, stool removed and patient feels better. Will go home with enema and Miralax. To f/u with PMD.

## 2018-05-19 NOTE — ED ADULT NURSE NOTE - OBJECTIVE STATEMENT
Pt came to ER with c/o rectal pain and constipation for about 2 days. Pt states she was recently on antibiotic for a UTI but has never been constipated before.

## 2018-06-19 NOTE — ED PROVIDER NOTE - DISPOSITION TYPE
----- Message from Jackie Hartman sent at 6/19/2018 10:44 AM EDT -----  Regarding: Dr. Juliet Mclean [phone]     Cheng Lee, daughter, is requesting a Rx be called in to [address] [facility], [address] [facility], [address], [address] [phone]. Pt has fever blisters across top and bottom lips, spreading since yesterday. ADMIT

## 2021-01-07 ENCOUNTER — EMERGENCY (EMERGENCY)
Facility: HOSPITAL | Age: 46
LOS: 1 days | Discharge: HOME | End: 2021-01-07
Admitting: EMERGENCY MEDICINE
Payer: COMMERCIAL

## 2021-01-07 VITALS
HEIGHT: 65 IN | HEART RATE: 79 BPM | SYSTOLIC BLOOD PRESSURE: 132 MMHG | TEMPERATURE: 99 F | RESPIRATION RATE: 18 BRPM | OXYGEN SATURATION: 99 % | DIASTOLIC BLOOD PRESSURE: 77 MMHG

## 2021-01-07 DIAGNOSIS — Z98.890 OTHER SPECIFIED POSTPROCEDURAL STATES: Chronic | ICD-10-CM

## 2021-01-07 DIAGNOSIS — Z20.822 CONTACT WITH AND (SUSPECTED) EXPOSURE TO COVID-19: ICD-10-CM

## 2021-01-07 DIAGNOSIS — E89.0 POSTPROCEDURAL HYPOTHYROIDISM: Chronic | ICD-10-CM

## 2021-01-07 PROBLEM — E05.00 THYROTOXICOSIS WITH DIFFUSE GOITER WITHOUT THYROTOXIC CRISIS OR STORM: Chronic | Status: ACTIVE | Noted: 2017-06-15

## 2021-01-07 PROBLEM — D50.9 IRON DEFICIENCY ANEMIA, UNSPECIFIED: Chronic | Status: ACTIVE | Noted: 2017-06-15

## 2021-01-07 LAB — SARS-COV-2 RNA SPEC QL NAA+PROBE: SIGNIFICANT CHANGE UP

## 2021-01-07 PROCEDURE — U0003: CPT

## 2021-01-07 PROCEDURE — 99283 EMERGENCY DEPT VISIT LOW MDM: CPT

## 2021-01-07 PROCEDURE — U0005: CPT

## 2021-01-07 NOTE — ED PROVIDER NOTE - PATIENT PORTAL LINK FT
You can access the FollowMyHealth Patient Portal offered by [facility] by registering at the following website: [email or web address]. By joining [employer]’s FollowMyHealth portal, you will also be able to view your health information using other applications (apps) compatible with our system.

## 2022-11-22 NOTE — ED ADULT NURSE NOTE - NS ED NURSE RECORD ANOTHER VITAL SIGN
Yes Referred To Plastics For Closure Text (Leave Blank If You Do Not Want): After obtaining clear surgical margins the patient was sent to plastics for surgical repair.  The patient understands they will receive post-surgical care and follow-up from the referring physician's office.

## 2023-01-06 ENCOUNTER — NON-APPOINTMENT (OUTPATIENT)
Age: 48
End: 2023-01-06

## 2023-01-06 ENCOUNTER — APPOINTMENT (OUTPATIENT)
Dept: OTOLARYNGOLOGY | Facility: CLINIC | Age: 48
End: 2023-01-06
Payer: COMMERCIAL

## 2023-01-06 VITALS
OXYGEN SATURATION: 98 % | SYSTOLIC BLOOD PRESSURE: 103 MMHG | BODY MASS INDEX: 20.83 KG/M2 | WEIGHT: 125 LBS | HEIGHT: 65 IN | HEART RATE: 68 BPM | DIASTOLIC BLOOD PRESSURE: 68 MMHG | TEMPERATURE: 98 F

## 2023-01-06 DIAGNOSIS — R09.81 NASAL CONGESTION: ICD-10-CM

## 2023-01-06 DIAGNOSIS — H93.8X3 OTHER SPECIFIED DISORDERS OF EAR, BILATERAL: ICD-10-CM

## 2023-01-06 PROCEDURE — 99204 OFFICE O/P NEW MOD 45 MIN: CPT | Mod: 25

## 2023-01-06 PROCEDURE — 31231 NASAL ENDOSCOPY DX: CPT

## 2023-01-06 RX ORDER — MOMETASONE 50 UG/1
50 SPRAY, METERED NASAL DAILY
Qty: 1 | Refills: 3 | Status: ACTIVE | COMMUNITY
Start: 2023-01-06 | End: 1900-01-01

## 2023-01-06 RX ORDER — LEVOCETIRIZINE DIHYDROCHLORIDE 5 MG/1
5 TABLET ORAL DAILY
Qty: 1 | Refills: 3 | Status: ACTIVE | COMMUNITY
Start: 2023-01-06 | End: 1900-01-01

## 2023-01-06 RX ORDER — AZELASTINE HYDROCHLORIDE 137 UG/1
0.1 SPRAY, METERED NASAL DAILY
Qty: 1 | Refills: 6 | Status: ACTIVE | COMMUNITY
Start: 2023-01-06 | End: 1900-01-01

## 2023-01-06 NOTE — REASON FOR VISIT
[Initial Consultation] : an initial consultation for [FreeTextEntry2] : nasal congestion, ear fullness

## 2023-01-06 NOTE — HISTORY OF PRESENT ILLNESS
[de-identified] : 47F presents for general ENT evaluation. She is a swimmer. She complains of chronic nasal congestion, bilateral ear clogging and sensitivity to noise. She feels like "there's always water" in her ears. These symptoms have been present for 5 years and worsening since. Denies otalgia, otorrhea, tinnitus, hearing loss or vertiginous symptoms. Recently had a right ear infection treated with antibiotic ear drops. Denies facial pressure, drainage, changes in sense of taste or smell.

## 2023-01-06 NOTE — PROCEDURE
[Anterior rhinoscopy insufficient to account for symptoms] : anterior rhinoscopy insufficient to account for symptoms [Topical Lidocaine] : topical lidocaine [Oxymetazoline HCl] : oxymetazoline HCl [Flexible Endoscope] : examined with the flexible endoscope [Normal] : the paranasal sinuses had no abnormalities

## 2023-06-01 ENCOUNTER — APPOINTMENT (OUTPATIENT)
Dept: HEMATOLOGY ONCOLOGY | Facility: CLINIC | Age: 48
End: 2023-06-01

## 2023-07-27 ENCOUNTER — APPOINTMENT (OUTPATIENT)
Dept: HEMATOLOGY ONCOLOGY | Facility: CLINIC | Age: 48
End: 2023-07-27

## 2023-12-06 ENCOUNTER — APPOINTMENT (OUTPATIENT)
Dept: HEMATOLOGY ONCOLOGY | Facility: CLINIC | Age: 48
End: 2023-12-06

## 2025-06-15 ENCOUNTER — EMERGENCY (EMERGENCY)
Facility: HOSPITAL | Age: 50
LOS: 1 days | End: 2025-06-15
Attending: STUDENT IN AN ORGANIZED HEALTH CARE EDUCATION/TRAINING PROGRAM | Admitting: STUDENT IN AN ORGANIZED HEALTH CARE EDUCATION/TRAINING PROGRAM
Payer: COMMERCIAL

## 2025-06-15 VITALS
WEIGHT: 130.07 LBS | HEART RATE: 84 BPM | HEIGHT: 66 IN | RESPIRATION RATE: 17 BRPM | OXYGEN SATURATION: 100 % | DIASTOLIC BLOOD PRESSURE: 74 MMHG | TEMPERATURE: 99 F | SYSTOLIC BLOOD PRESSURE: 144 MMHG

## 2025-06-15 DIAGNOSIS — E89.0 POSTPROCEDURAL HYPOTHYROIDISM: Chronic | ICD-10-CM

## 2025-06-15 DIAGNOSIS — Z98.890 OTHER SPECIFIED POSTPROCEDURAL STATES: Chronic | ICD-10-CM

## 2025-06-15 PROCEDURE — 90715 TDAP VACCINE 7 YRS/> IM: CPT

## 2025-06-15 PROCEDURE — 90471 IMMUNIZATION ADMIN: CPT

## 2025-06-15 PROCEDURE — 99284 EMERGENCY DEPT VISIT MOD MDM: CPT

## 2025-06-15 PROCEDURE — 99283 EMERGENCY DEPT VISIT LOW MDM: CPT | Mod: 25

## 2025-06-15 RX ORDER — LIDOCAINE HCL/EPINEPHRINE/PF 1 %-1:200K
5 AMPUL (ML) INJECTION ONCE
Refills: 0 | Status: COMPLETED | OUTPATIENT
Start: 2025-06-15 | End: 2025-06-15

## 2025-06-15 RX ADMIN — Medication 5 MILLILITER(S): at 14:58

## 2025-06-15 NOTE — ED PROVIDER NOTE - CLINICAL SUMMARY MEDICAL DECISION MAKING FREE TEXT BOX
51 yo right-handed F presents for evaluation of a laceration to her right thumb. Patient is non-toxic, no acute distress, well-appearing, afebrile with no tachycardia or hypotension.     Tdap updated.    Right thumb fingertip skin avulsion. Soaked in lidocaine w/ epi, bleeding improved. Surgicel applied. Bleeding resolved. Dermabond applied and gauze and coband applied.      Discussed plan, answered all questions, and addressed all concerns. Reviewed strict return precautions. Patient verbalized understanding and agreement with treatment plan, return precautions, and discharge instructions. Instructed patient to return for any new, worsening, or concerning symptoms. 51 yo right-handed F presents for evaluation of a laceration to her right thumb. Patient is non-toxic, no acute distress, well-appearing, afebrile with no tachycardia or hypotension.     Tdap updated.    Right thumb fingertip skin avulsion. Soaked in lidocaine w/ epi, bleeding improved. Surgicel applied. Bleeding resolved. Gauze and coband applied.     Upon re-evaluation, bleeding resolved. Counseled patient regarding wound care and outpatient follow-up with hand surgery.     Discussed plan, answered all questions, and addressed all concerns. Reviewed strict return precautions. Patient verbalized understanding and agreement with treatment plan, return precautions, and discharge instructions. Instructed patient to return for any new, worsening, or concerning symptoms.

## 2025-06-15 NOTE — ED PROVIDER NOTE - OBJECTIVE STATEMENT
49 yo right-handed F presents for evaluation of a laceration to her right thumb. She was using a slicer at home and accidentally sliced her right thumb. She had some bleeding. Tetanus is not up to date. No other injuries. Bleeding controlled with a bandage prior to arrival.

## 2025-06-15 NOTE — ED ADULT TRIAGE NOTE - CHIEF COMPLAINT QUOTE
Pt reports laceration to right thumb after cutting it while slicing food. Pt not UTD on Tetanus. Reports hx anemia. Denies blood thinner use.

## 2025-06-15 NOTE — ED PROVIDER NOTE - NSFOLLOWUPINSTRUCTIONS_ED_ALL_ED_FT
Deep Skin Avulsion  A deep skin avulsion is an injury that tears away the skin, the tissue beneath it, or an entire body part. If you have this injury, all the layers of your skin are torn off. This condition often results from a severe injury that usually occurs during violent accidents. The areas of the body most often affected include the face, lips, ears, nose, and fingers.    Skin avulsion injuries are also sometimes called degloving injuries since the top layers of your skin and tissue are ripped from the muscle beneath it. The connective tissue and bone may also be ripped. A deep skin avulsion may leave bone, muscle, tendon, nerves, and blood vessels exposed or damaged.    What are the causes?  This condition may be caused by an injury, such as:  A crush.  A fall against a rough surface.  An animal bite.  A gunshot wound.  A severe burn.  A bicycle or motorcycle accident, where you're dragged on a rough surface.  What are the signs or symptoms?  Symptoms of this condition include:  Pain.  Numbness.  Swelling.  Bleeding, which may be heavy.  How is this diagnosed?  This condition may be diagnosed with a medical history and physical exam. You may also have X-rays.    How is this treated?  Treatment usually starts with:  Stopping the bleeding.  Washing the wound with a germ-free solution.  After the first treatment, the wound may be closed or left open to heal.  Wounds that are small and clean may be closed with stitches.  Wounds that cannot be closed with stitches may be covered with a piece of skin or your own skin flap. This is called a graft.  Wounds that are hard to close or that may become infected may be left open. These wounds heal over time.  You may also be given antibiotics, pain medicine, or a tetanus shot.    Follow these instructions at home:  Wound care    Two wounds closed with skin glue. One is normal. The other is red with pus.  Follow instructions from your health care provider about how to take care of your wound. Make sure you:  Wash your hands with soap and water for at least 20 seconds before and after you change your bandage. If soap and water are not available, use hand .  Change your dressing as told by your provider.  Leave stitches, skin glue, or tape strips in place. These skin closures may need to stay in place for 2 weeks or longer. If tape strip edges start to loosen and curl up, you may trim the loose edges. Do not remove tape strips completely unless your provider tells you to do that.  Clean the wound each day or as told by your provider:  Wash your wound using mild soap and water, a wound cleanser, or a saltwater or saline solution.  Do not use hydrogen peroxide or alcohol. These can slow healing.  Rinse the wound with water to remove all soap.  Pat the wound dry with a clean towel. Do not rub it.  Cover the wound with a clean dressing.  Keep your dressing clean and dry.  Do not take baths, swim, use a hot tub, or do anything that would put your wound under water until your provider approves.  Check your wound every day for signs of infection. Check for:  More redness, swelling, or pain.  More fluid or blood.  Warmth.  Pus or a bad smell.  Do not scratch or pick at the wound.  Medicines    If you were prescribed antibiotics, take or apply them as told by your provider. Do not stop taking or using the antibiotic even if you start to feel better.  Take over-the-counter and prescription medicines only as told by your provider.  If you were prescribed a pain medicine, take it 30 minutes or more before you do any wound care, or as told by your provider.  Ask your provider if the medicine prescribed to you requires you to avoid driving or using machinery.  General instructions    Raise the injured area above the level of your heart while you are sitting or lying down.  Do not use any products that contain nicotine or tobacco. These products include cigarettes, chewing tobacco, and vaping devices, such as e-cigarettes. These can delay wound healing. If you need help quitting, ask your provider.  Eat a healthy diet to help your wound heal. This includes eating foods rich in protein, vitamin A, and vitamin C.  Keep all follow-up visits so your healing can be checked.  Contact a health care provider if:  You have pain that does not get better with medicine.  You have a fever or any other signs of infection.  You got a tetanus shot and you have swelling, severe pain, redness, or bleeding at the injection site.  You vomit or feel like you may vomit.  You see something coming out of the wound, such as wood or glass.  Get help right away if:  You have a red streak going away from your wound.  A wound that was closed breaks open.  The wound is bleeding, and the bleeding does not stop with gentle pressure.  You have trouble breathing.  The wound is on your hand or foot, and:  You cannot properly move a finger or toe.  Your fingers or toes look pale or bluish.

## 2025-06-15 NOTE — ED PROVIDER NOTE - CARE PROVIDER_API CALL
Katherine Yusuf [facility]  Plastic Surgery  [address] [place] [id number]  Phone: [phone]  Fax: [phone]  Follow Up Time:

## 2025-06-15 NOTE — ED PROVIDER NOTE - PATIENT PORTAL LINK FT
You can access the FollowMyHealth Patient Portal offered by [facility] by registering at the following website: [email or web address]. By joining [employer]’s FollowMyHealth portal, you will also be able to view your health information using other applications (apps) compatible with our system.

## 2025-06-15 NOTE — ED ADULT NURSE NOTE - OBJECTIVE STATEMENT
R thumb lac on mandolin.   denies other injury  arrives with bandage to R thumb and noted with blood on bandage

## 2025-06-17 ENCOUNTER — APPOINTMENT (OUTPATIENT)
Dept: ORTHOPEDIC SURGERY | Facility: CLINIC | Age: 50
End: 2025-06-17
Payer: COMMERCIAL

## 2025-06-17 VITALS — HEIGHT: 66 IN | WEIGHT: 130 LBS | RESPIRATION RATE: 16 BRPM | BODY MASS INDEX: 20.89 KG/M2

## 2025-06-17 DIAGNOSIS — Z23 ENCOUNTER FOR IMMUNIZATION: ICD-10-CM
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
## 2025-06-17 DIAGNOSIS — S61.011A LACERATION WITHOUT FOREIGN BODY OF RIGHT THUMB WITHOUT DAMAGE TO NAIL, INITIAL ENCOUNTER: ICD-10-CM
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
## 2025-06-17 PROCEDURE — 99203 OFFICE O/P NEW LOW 30 MIN: CPT

## 2025-06-23 ENCOUNTER — APPOINTMENT (OUTPATIENT)
Dept: ORTHOPEDIC SURGERY | Facility: CLINIC | Age: 50
End: 2025-06-23
Payer: COMMERCIAL

## 2025-06-23 PROCEDURE — 99213 OFFICE O/P EST LOW 20 MIN: CPT

## 2025-07-07 ENCOUNTER — APPOINTMENT (OUTPATIENT)
Dept: ORTHOPEDIC SURGERY | Facility: CLINIC | Age: 50
End: 2025-07-07